# Patient Record
(demographics unavailable — no encounter records)

---

## 2024-12-24 NOTE — PHYSICAL EXAM
[TextEntry] : Gen - In NAD, communicating easily and in full sentences HEENT - NC/AT CV - +S1, S2 Resp - Bibasilar crackles Ext - No pedal edema Skin - No exposed rashes or lesions Neuro - Moves all four extremities Psych - Normal affect

## 2024-12-24 NOTE — HISTORY OF PRESENT ILLNESS
[Former] : former [TextBox_4] : 82M PMH poor historian, PE, asthma, CKD, DM, HFrEF, HTN, valvular heart disease who presents for f/u visit, post-hospitalization. She is on Trelegy 200, Singulair, PRN albuterol. Was recently at [facility] hospitalized for CHF, hypotension, noted with elevated eosinophil count. He was in the ICU for 1 day and was hospitalized for 3-4 days, was discharged 12/18/24. Having some yellow sputum.    #[id number] [TextBox_11] : Social [TextBox_13] : 9 [YearQuit] : 1969

## 2024-12-24 NOTE — ASSESSMENT
[FreeTextEntry1] : 82M PMH poor historian, PE, asthma, CKD, DM, HFrEF, HTN, valvular heart disease who presents for f/u visit, post-hospitalization.   - C/w Trelegy 200, Singulair, PRN albuterol - refills sent - Checking CBC, CMP, TSH, BNP, allergen panel, IgE - Repeat CT chest 02/2025 - Obtaining records from [facility] - No wheezing on exam - F/u with his cardiologist Dr. Colvin - F/u in 3 mo time with PFT  The patient expressed understanding and agreement with the plan as outlined above and accepts responsibility to be compliant with any recommended testing, treatment, and follow-up visits.  All relevant questions and concerns were addressed.  30 minutes of time were spent on the encounter. Medical records were reviewed, including but not limited to hospital records, outpatient records, laboratory data, and diagnostic imaging studies. Greater than 50% of the face-to-face encounter time was spent on counseling and/or coordination of care.   Boaz Beck MD, [facility] Pul[facility] Critical [facility] Medicine [facility] at [place] [address] [place] [address] T: [phone] F: [phone]

## 2025-03-25 NOTE — RESULTS/DATA
[TextEntry] : [facility] EXAM: [id number] - CT CHEST  - ORDERED BY: RADHA MORENO   PROCEDURE DATE:  02/15/2025    INTERPRETATION:  INDICATION: Follow-up CT to evaluate cough and pleural effusions.  Axial CT images of the chest are obtained without intravenous administration of contrast.  COMPARISON: No prior chest CTs.  LYMPH NODES/MEDIASTINUM: No lymphadenopathy. Calcified mediastinal and hilar lymph nodes are suggestive of prior granulomatous infection.  VASCULATURE/HEART: Cardiomegaly with marked dilation of the left atrium. Vascular calcifications with involvement of the aorta and the coronary arteries. Status post TAVR. No pericardial effusion.  UPPER ABDOMEN: Colonic diverticulosis. Hypodensities within the left kidney.  LUNGS/PLEURA: No pleural effusions. Mild bilateral lower lung linear or subsegmental areas of atelectasis.  Mild emphysema with emphysematous chronic cysts.  2 mm right upper lobe nodule on image 42 of series 2. 2 mm right middle lobe nodule on image 65 of series 2. 6 mm nodule associated with the right major fissure suggestive of a lymph node.  2 mm left lower lobe calcified granulomas.  Nonspecific mild bilateral interlobular septal thickening.  Trace secretion within the trachea.  CHEST WALL: Spinal Degenerative Changes. Healed old right rib fractures. Mild to moderate loss of height of the T11-L2 vertebral bodies are of indeterminate age.  IMPRESSION:  No pleural effusions.  Mild interlobular septal thickening likely related to the cardiac dysfunction given the cardiomegaly can be seen in setting of fluid overload.  3 small right lung nodules measuring up to about 6 mm. Emphysema.  Mild-to-moderate loss of height of a few of the lower spinal vertebral bodies is of indeterminate age. Clinical correlation for back pain is recommended. Spinal MRI can be performed for further evaluation as clinically warranted.  --- End of Report ---       ZAIRA TRIANA MD; Attending Radiologist This document has been electronically signed. Feb 21 2025 10:16AM  ~~~~~~~~~~~~~~~~~~~~~~~~~~~~~~~~~~~~~~~~~~~~~~~~~~~~~~~~~~~~~~~~~~~~~~~~

## 2025-03-25 NOTE — ASSESSMENT
[FreeTextEntry1] : 82M PMH poor historian, PE, asthma, mild COPD, CKD, DM, HFrEF, HTN, valvular heart disease who presents for f/u visit.   - PFT today showed FEV1/FVC 49%, FEV1 71%, %, VCmax 114%, DLCOc 60%, DL/VA 45%.  - This is consistent with mild COPD, with likely asthma component - C/w Trelegy 200, Singulair, PRN albuterol.  - Refills all sent - CT 2/15/25 with nodules up to 6mm and mild septal thickening/pulmonary edema.  - No further need to f/u nodules given her age and distant smoking history; low risk for malignancy - Will f/u routine in 3-4 mo time  The patient expressed understanding and agreement with the plan as outlined above and accepts responsibility to be compliant with any recommended testing, treatment, and follow-up visits.  All relevant questions and concerns were addressed.  30 minutes of time were spent on the encounter. Medical records were reviewed, including but not limited to hospital records, outpatient records, laboratory data, and diagnostic imaging studies. Greater than 50% of the face-to-face encounter time was spent on counseling and/or coordination of care.   Boaz Beck MD, [facility] Pulmonary and [facility] Medicine at [place] [address] [place], [place] [address] T: [phone] F: [phone]

## 2025-03-25 NOTE — HISTORY OF PRESENT ILLNESS
[Former] : former [TextBox_4] : 82M PMH poor historian, PE, asthma, mild COPD, CKD, DM, HFrEF, HTN, valvular heart disease who presents for f/u visit. He is on Trelegy 200, Singulair, PRN albuterol. CT 2/15/25 with nodules up to 6mm and mild septal thickening/pulmonary edema. Has not yet had bloodwork done. PFT today showed FEV1/FVC 49%, FEV1 71%, %, VCmax 114%, DLCOc 60%, DL/VA 45%.    #[id number] [TextBox_11] : Social [TextBox_13] : 9 [YearQuit] : 1969